# Patient Record
Sex: MALE | Race: WHITE | Employment: FULL TIME | URBAN - METROPOLITAN AREA
[De-identification: names, ages, dates, MRNs, and addresses within clinical notes are randomized per-mention and may not be internally consistent; named-entity substitution may affect disease eponyms.]

---

## 2017-08-30 ENCOUNTER — OFFICE VISIT (OUTPATIENT)
Dept: INTERNAL MEDICINE CLINIC | Age: 37
End: 2017-08-30

## 2017-08-30 VITALS
BODY MASS INDEX: 25.61 KG/M2 | HEART RATE: 88 BPM | SYSTOLIC BLOOD PRESSURE: 138 MMHG | DIASTOLIC BLOOD PRESSURE: 86 MMHG | HEIGHT: 68 IN | WEIGHT: 169 LBS

## 2017-08-30 DIAGNOSIS — K64.9 HEMORRHOIDS, UNSPECIFIED HEMORRHOID TYPE: ICD-10-CM

## 2017-08-30 DIAGNOSIS — K62.89 RECTAL PAIN: Primary | ICD-10-CM

## 2017-08-30 PROCEDURE — 99214 OFFICE O/P EST MOD 30 MIN: CPT | Performed by: FAMILY MEDICINE

## 2017-08-30 RX ORDER — IBUPROFEN 200 MG
200 TABLET ORAL EVERY 6 HOURS PRN
COMMUNITY

## 2017-08-30 RX ORDER — CALCIUM POLYCARBOPHIL 625 MG 625 MG/1
625 TABLET ORAL DAILY
COMMUNITY

## 2017-08-30 RX ORDER — DIPHENHYDRAMINE HCL 25 MG
25 CAPSULE ORAL EVERY 6 HOURS PRN
COMMUNITY

## 2017-08-30 RX ORDER — HYDROCORTISONE ACETATE 25 MG/1
25 SUPPOSITORY RECTAL 2 TIMES DAILY PRN
Qty: 20 SUPPOSITORY | Refills: 0 | Status: SHIPPED | OUTPATIENT
Start: 2017-08-30

## 2017-08-30 ASSESSMENT — ENCOUNTER SYMPTOMS
BLOOD IN STOOL: 0
CONSTIPATION: 0
VOMITING: 0
ANAL BLEEDING: 1
RECTAL PAIN: 1
ABDOMINAL PAIN: 0

## 2017-08-30 ASSESSMENT — PATIENT HEALTH QUESTIONNAIRE - PHQ9
SUM OF ALL RESPONSES TO PHQ9 QUESTIONS 1 & 2: 0
SUM OF ALL RESPONSES TO PHQ QUESTIONS 1-9: 0
1. LITTLE INTEREST OR PLEASURE IN DOING THINGS: 0
2. FEELING DOWN, DEPRESSED OR HOPELESS: 0

## 2017-09-11 ENCOUNTER — OFFICE VISIT (OUTPATIENT)
Dept: SURGERY | Age: 37
End: 2017-09-11

## 2017-09-11 VITALS
SYSTOLIC BLOOD PRESSURE: 134 MMHG | BODY MASS INDEX: 25.14 KG/M2 | DIASTOLIC BLOOD PRESSURE: 88 MMHG | HEIGHT: 68 IN | WEIGHT: 165.9 LBS

## 2017-09-11 DIAGNOSIS — K58.9 IRRITABLE BOWEL SYNDROME, UNSPECIFIED TYPE: Primary | ICD-10-CM

## 2017-09-11 PROCEDURE — 99243 OFF/OP CNSLTJ NEW/EST LOW 30: CPT | Performed by: SURGERY

## 2018-02-16 ENCOUNTER — HOSPITAL ENCOUNTER (OUTPATIENT)
Dept: OTHER | Age: 38
Discharge: HOME | End: 2018-02-16
Attending: SPECIALIST | Admitting: SPECIALIST

## 2018-02-16 LAB
ESTRADIOL LEVEL: 35 PG/ML
HCT VFR BLD CALC: 44.5 % (ref 40.5–52.5)

## 2018-02-20 LAB — TESTOSTERONE TOTAL: 946 NG/DL (ref 220–1000)

## 2022-05-29 ENCOUNTER — HOSPITAL ENCOUNTER (EMERGENCY)
Age: 42
Discharge: HOME | End: 2022-05-29
Payer: COMMERCIAL

## 2022-05-29 VITALS
HEART RATE: 69 BPM | DIASTOLIC BLOOD PRESSURE: 97 MMHG | SYSTOLIC BLOOD PRESSURE: 163 MMHG | OXYGEN SATURATION: 99 % | TEMPERATURE: 98.2 F | WEIGHT: 162 LBS | RESPIRATION RATE: 16 BRPM | BODY MASS INDEX: 24.55 KG/M2 | HEIGHT: 68 IN

## 2022-05-29 DIAGNOSIS — S61.012A LACERATION OF LEFT THUMB WITHOUT FOREIGN BODY WITHOUT DAMAGE TO NAIL, INITIAL ENCOUNTER: Primary | ICD-10-CM

## 2022-05-29 PROCEDURE — 12002 RPR S/N/AX/GEN/TRNK2.6-7.5CM: CPT

## 2022-05-29 PROCEDURE — 99283 EMERGENCY DEPT VISIT LOW MDM: CPT

## 2022-05-29 RX ORDER — CEPHALEXIN 500 MG/1
500 CAPSULE ORAL 4 TIMES DAILY
Qty: 28 CAPSULE | Refills: 0 | Status: SHIPPED | OUTPATIENT
Start: 2022-05-29 | End: 2022-06-05

## 2022-05-29 ASSESSMENT — ENCOUNTER SYMPTOMS
NAUSEA: 0
CHEST TIGHTNESS: 0
COUGH: 0
CONSTIPATION: 0
EYE REDNESS: 0
VOMITING: 0
ABDOMINAL PAIN: 0
DIARRHEA: 0
SORE THROAT: 0
RHINORRHEA: 0
SINUS PRESSURE: 0
SHORTNESS OF BREATH: 0
SINUS PAIN: 0
EYE DISCHARGE: 0

## 2022-05-29 ASSESSMENT — PAIN SCALES - GENERAL: PAINLEVEL_OUTOF10: 2

## 2022-05-29 ASSESSMENT — PAIN - FUNCTIONAL ASSESSMENT
PAIN_FUNCTIONAL_ASSESSMENT: NONE - DENIES PAIN
PAIN_FUNCTIONAL_ASSESSMENT: 0-10

## 2022-05-29 ASSESSMENT — PAIN DESCRIPTION - PAIN TYPE: TYPE: ACUTE PAIN

## 2022-05-29 ASSESSMENT — PAIN DESCRIPTION - LOCATION: LOCATION: HAND

## 2022-05-29 ASSESSMENT — PAIN DESCRIPTION - ORIENTATION: ORIENTATION: LEFT

## 2022-05-29 NOTE — ED NOTES
Laceration. Pt states \"I cut my left thumb with a knife cutting away trim on a deck\". Bleeding controlled. Pt right hand dominant.      Colonel Jay LPN  23/36/67 0896

## 2022-05-29 NOTE — ED NOTES
Pt suture repair applied with PSO/Adaptic/gauze/kerlex/coban to help keep dressing clean.      Antony Dominique LPN  54/80/04 1144

## 2022-05-29 NOTE — ED PROVIDER NOTES
[facility]  ED  EMERGENCY DEPARTMENT ENCOUNTER        Pt Name: Bertrand Lindo  MRN: [id number]  Armsalecgfmonique 1980  Date of evaluation: 5/29/2022  Provider: Magan Cobb PA-C  PCP: Kermit Sandifer, MD  ED Attending: No att. providers found      This patient was not seen and evaluated by the attending physician No att. providers found. I have independently evaluated this patient. CHIEF COMPLAINT       Chief Complaint   Patient presents with    Laceration     LEFT THUMB LACERATION WITH A KNIFE       HISTORY OF PRESENT ILLNESS   (Location/Symptom, Timing/Onset, Context/Setting, Quality, Duration, Modifying Factors, Severity)  Note limiting factors. Bertrand Lindo is a 42 y.o. male for valuation of a laceration to left hand first digit which occurred 20 minutes prior to arrival when patient was working on his deck at home and a knife slipped cutting his left hand. Patient is right-hand dominant. Patient indicates 2 out of 10 throbbing aching pain. Tetanus is UTD    Nursing Notes were all reviewed and agreed with or any disagreements were addressed  in the HPI. REVIEW OF SYSTEMS  (2-9 systems for level 4, 10 or more for level 5)     Review of Systems   Constitutional: Negative for chills and fever. HENT: Negative. Negative for congestion, rhinorrhea, sinus pressure, sinus pain and sore throat. Eyes: Negative for discharge, redness and visual disturbance. Respiratory: Negative for cough, chest tightness and shortness of breath. Cardiovascular: Negative for chest pain and palpitations. Gastrointestinal: Negative for abdominal pain, constipation, diarrhea, nausea and vomiting. Genitourinary: Negative for difficulty urinating, dysuria and frequency. Musculoskeletal: Negative. Skin: Positive for wound. Neurological: Negative. Negative for dizziness, weakness, numbness and headaches. Psychiatric/Behavioral: Negative.     All other systems reviewed and are negative. Positivesand Pertinent negatives as per HPI. Except as noted above in the ROS, all other systems were reviewed and negative. PAST MEDICAL HISTORY     Past Medical History:   Diagnosis Date    GERD (gastroesophageal reflux disease)          SURGICAL HISTORY       Past Surgical History:   Procedure Laterality Date    HYPOSPADIAS CORRECTION  1980    KNEE CARTILAGE SURGERY  1990    epi plyceile/ growth plate procedure         CURRENT MEDICATIONS       Discharge Medication List as of 5/29/2022  1:21 PM      CONTINUE these medications which have NOT CHANGED    Details   diphenhydrAMINE (BENADRYL) 25 MG capsule Take 25 mg by mouth every 6 hours as needed for ItchingHistorical Med      ibuprofen (ADVIL;MOTRIN) 200 MG tablet Take 200 mg by mouth every 6 hours as needed for PainHistorical Med      polycarbophil (FIBERCON) 625 MG tablet Take 625 mg by mouth dailyHistorical Med      hydrocortisone (ANUSOL-HC) 25 MG suppository Place 1 suppository rectally 2 times daily as needed for Hemorrhoids, Disp-20 suppository, R-0Normal      multivitamin (THERAGRAN) per tablet Take 1 tablet by mouth daily. ALLERGIES     Codeine    FAMILY HISTORY     History reviewed. No pertinent family history. SOCIAL HISTORY       Social History     Socioeconomic History    Marital status:      Spouse name: None    Number of children: None    Years of education: None    Highest education level: None   Occupational History    None   Tobacco Use    Smoking status: Current Every Day Smoker     Packs/day: 0.25     Years: 8.00     Pack years: 2.00    Smokeless tobacco: Never Used   Substance and Sexual Activity    Alcohol use:  Yes     Alcohol/week: 8.3 standard drinks     Types: 10 Standard drinks or equivalent per week    Drug use: No    Sexual activity: None   Other Topics Concern    None   Social History Narrative    None     Social Determinants of Health     Financial Resource Strain:     Difficulty of Paying Living Expenses: Not on file   Food Insecurity:     Worried About Running Out of Food in the Last Year: Not on file    Ran Out of Food in the Last Year: Not on file   Transportation Needs:     Lack of Transportation (Medical): Not on file    Lack of Transportation (Non-Medical): Not on file   Physical Activity:     Days of Exercise per Week: Not on file    Minutes of Exercise per Session: Not on file   Stress:     Feeling of Stress : Not on file   Social Connections:     Frequency of Communication with Friends and Family: Not on file    Frequency of Social Gatherings with Friends and Family: Not on file    Attends Alevism Services: Not on file    Active Member of [address] [place] [address] internetstores or Organizations: Not on file    Attends Club or Organization Meetings: Not on file    Marital Status: Not on file   Intimate Partner Violence:     Fear of Current or Ex-Partner: Not on file    Emotionally Abused: Not on file    Physically Abused: Not on file    Sexually Abused: Not on file   Housing Stability:     Unable to Pay for Housing in the Last Year: Not on file    Number of [place] in the Last Year: Not on file    Unstable Housing in the Last Year: Not on file       SCREENINGS     NIH Score       [facility]     Heart Score         PHYSICAL EXAM    (up to 7 for level 4, 8 ormore for level 5)     ED Triage Vitals [05/29/22 1204]   BP Temp Temp src Heart Rate Resp SpO2 Height Weight   (!) 165/98 (!) 96.3 °F (35.7 °C) -- 72 -- 97 % 5' 8\" (1.727 m) 162 lb (73.5 kg)       GENERAL APPEARANCE: Awake and alert. Cooperative. No acute distress. HEAD: Normocephalic. Atraumatic. EYES: PERRL. EOM's grossly intact. ENT: Mucous membranes are moist.   NECK: Supple. Normal ROM. CHEST: Equal symmetric chest rise. RRR  LUNGS: Breathing is unlabored. Speaking comfortably in full sentences. LCAB  Abdomen: Nondistended  EXTREMITIES: MAEE. No acute deformities. SKIN: Warm and dry.   Left hand first digit, dorsal aspect 3cm laceration. ROM normal, No fb, NV intact, SILT. NEUROLOGICAL: Alert and oriented. Strength is 5/5 in all extremities and sensation is intact. PROCEDURES   PROCEDURE:  LACERATION REPAIR  Harjinder Tao or their surrogate had an opportunity to ask questions, and the risks, benefits, and alternatives were discussed. The wound was prepped and draped to maintain a sterile field. A local anesthetic was used to completely anesthetize the wound. It was copiously irrigated. It was explored to its depth in a bloodless field with no sign of tendon, nerve, or vascular injury. No foreign bodies were identified. It was closed with 8x running locked sutures. There were no complications during the procedure. CRITICAL CARE TIME   N/A    Is this patient to be included in the SEP-1 Core Measure due to severe sepsis or septic shock? No   Exclusion criteria - the patient is NOT to be included for SEP-1 Core Measure due to: Infection is not suspected     CONSULTS:  None      EMERGENCY DEPARTMENT COURSE and DIFFERENTIAL DIAGNOSIS/MDM:   Vitals:    Vitals:    05/29/22 1204 05/29/22 1327   BP: (!) 165/98 (!) 163/97   Pulse: 72 69   Resp:  16   Temp: (!) 96.3 °F (35.7 °C) 98.2 °F (36.8 °C)   TempSrc:  Oral   SpO2: 97% 99%   Weight: 162 lb (73.5 kg)    Height: 5' 8\" (1.727 m)        Patient was given the following medications:  Medications - No data to display      Afebrile, stable, patient presents to the ED for evaluation. Nontoxic patient in no acute distress SPO2 on room air of 99% patient is not hypoxic. Patient with mild hypertension initial presentation blood pressure 163/97. Patient's wound is repaired without complication. Indications for return to the ED are discussed. Patient is advised suture removal in 10 days time. All questions are answered. Indications for return to the ED are discussed.   Patient is advised if any new or worsening symptoms arise they should immediately return to the emergency room. Follow-up with primary care in 1-2 days. The patient tolerated their visit well. The patient and / or the family were informed of the results of any tests, a time was given to answer questions, a plan was proposed and they agreed Rachael Sotelo. I estimate there is LOW risk for CELLULITIS, COMPARTMENT SYNDROME, NECROTIZING FASCIITIS, TENDON OR NEUROVASCULAR INJURY, or FOREIGN BODY, thus I consider the discharge disposition reasonable. Also, there is no evidence or peritonitis, sepsis, or toxicity. Iftikhar Genao and I have discussed the diagnosis and risks, and we agree with discharging home to follow-up with their primary doctor. We also discussed returning to the Emergency Department immediately if new or worsening symptoms occur. We have discussed the symptoms which are most concerning (e.g., changing or worsening pain, fever, numbness, weakness, cool or painful digits) that necessitate immediate return. FINAL IMPRESSION      1. Laceration of left thumb without foreign body without damage to nail, initial encounter          DISPOSITION/PLAN   DISPOSITION Decision To Discharge 05/29/2022 01:17:27 PM      PATIENT REFERRED TO:  Holly Hernandez MD  [address] [facility]   [facility] [address] [facility] [address]  [phone]    For suture removal in 10-12 days      DISCHARGE MEDICATIONS:  Discharge Medication List as of 5/29/2022  1:21 PM      START taking these medications    Details   cephALEXin (KEFLEX) 500 MG capsule Take 1 capsule by mouth 4 times daily for 7 days, Disp-28 capsule, R-0Print             DISCONTINUED MEDICATIONS:  Discharge Medication List as of 5/29/2022  1:21 PM                 Pt was seen during the COVID 19 pandemic. Appropriate PPE worn by ME during patient encounters.  Pt seen during a time with constrained hospital bed capacity and other potential inpatient and outpatient resources were constrained due to the viral pandemic.    Please note that portions of this note were completed with a voice recognition program.  Efforts were made to edit the dictations but occasionally words are mis-transcribed.)    Jaspal Draper PA-C (electronically signed)        Jaspal Draper PA-C  05/29/22 1551

## 2022-05-29 NOTE — ED NOTES
Pt wound was not cleansed and or irrigated with Hibiclens/or Normal Saline Solution prior to suture placement via Nurse or ED Tech. Estella SOSA made aware.      Zoila Vyas LPN  06/09/04 5532

## 2024-07-29 ENCOUNTER — TELEPHONE (OUTPATIENT)
Dept: CARDIOLOGY CLINIC | Age: 44
End: 2024-07-29

## 2024-08-06 ENCOUNTER — TELEPHONE (OUTPATIENT)
Dept: CARDIOLOGY CLINIC | Age: 44
End: 2024-08-06

## 2024-08-06 ENCOUNTER — OFFICE VISIT (OUTPATIENT)
Dept: CARDIOLOGY CLINIC | Age: 44
End: 2024-08-06

## 2024-08-06 ENCOUNTER — PATIENT MESSAGE (OUTPATIENT)
Dept: CARDIOLOGY CLINIC | Age: 44
End: 2024-08-06

## 2024-08-06 ENCOUNTER — ANCILLARY PROCEDURE (OUTPATIENT)
Dept: CARDIOLOGY CLINIC | Age: 44
End: 2024-08-06
Payer: COMMERCIAL

## 2024-08-06 VITALS
DIASTOLIC BLOOD PRESSURE: 94 MMHG | BODY MASS INDEX: 23.46 KG/M2 | HEIGHT: 68 IN | OXYGEN SATURATION: 99 % | WEIGHT: 154.8 LBS | HEART RATE: 63 BPM | SYSTOLIC BLOOD PRESSURE: 132 MMHG

## 2024-08-06 DIAGNOSIS — I48.0 PAF (PAROXYSMAL ATRIAL FIBRILLATION) (HCC): ICD-10-CM

## 2024-08-06 DIAGNOSIS — I49.9 IRREGULAR HEART RATE: ICD-10-CM

## 2024-08-06 DIAGNOSIS — I48.91 ATRIAL FIBRILLATION, UNSPECIFIED TYPE (HCC): Primary | ICD-10-CM

## 2024-08-06 DIAGNOSIS — I48.0 PAROXYSMAL ATRIAL FIBRILLATION (HCC): ICD-10-CM

## 2024-08-06 PROCEDURE — 93270 REMOTE 30 DAY ECG REV/REPORT: CPT | Performed by: INTERNAL MEDICINE

## 2024-08-06 RX ORDER — OMEPRAZOLE 10 MG/1
CAPSULE, DELAYED RELEASE ORAL
COMMUNITY
Start: 2010-02-02

## 2024-08-06 RX ORDER — ASPIRIN 325 MG
325 TABLET ORAL DAILY
COMMUNITY

## 2024-08-06 NOTE — PROGRESS NOTES
[facility]   Electrophysiology Consult Note              Date: 8/6/24  Patient Name: Harjinder Tao  YOB: 1980    Primary Care Physician: Jose Guadalupe Thomson MD    CHIEF COMPLAINT:   Chief Complaint   Patient presents with    New Patient    Atrial Fibrillation    Palpitations     HISTORY OF PRESENT ILLNESS: Harjinder Tao is a 43 y.o. male who presents for evaluation for PAF. Patient was in ER in 7/2024 with new onset AF and had successful DCCV at [facility].     Today, 8/6/2024, EKG demonstrates SR 63 bpm. He reports that he is doing ok since discharge from hospital. He drinks alcohol daily, but is trying to cut back on this. He could tell when he was in AF prior to going to the ER. He is taking his medications as prescribed. Patient denies current edema, chest pain, shortness of breath, palpitations, dizziness or syncope.     Past Medical History:   has a past medical history of GERD (gastroesophageal reflux disease).    Past Surgical History:   has a past surgical history that includes Knee cartilage surgery (1990) and Hypospadius correction (1980).     Allergies:  Codeine    Social History:   reports that he has been smoking cigarettes. He has a 1.5 pack-year smoking history. He has never used smokeless tobacco. He reports current alcohol use of about 8.3 standard drinks of alcohol per week. He reports that he does not use drugs.     Family History: family history includes Cancer in his maternal grandfather; Heart Surgery in his maternal grandmother; High Blood Pressure in his maternal grandfather and maternal grandmother; High Cholesterol in his maternal grandmother and mother; Other in his father and mother.    Home Medications:    Prior to Admission medications    Medication Sig Start Date End Date Taking? Authorizing Provider   aspirin 325 MG tablet Take 1 tablet by mouth daily   Yes Provider, MD Solomon   omeprazole (PRILOSEC) 10 MG delayed release capsule  2/2/10  Yes Provider,

## 2024-08-06 NOTE — PATIENT INSTRUCTIONS
RECOMMENDATIONS:  Discussed at length treatment options for AF:   1. Medications to slow heart rate (goal of less than 90 at rest, less than 110 with mild activity)   2. Medications for rhythm control (amiodarone, flecainide, dronedarone, and propafenone or dofetilide or sotalol which requires hospital admission for four days). Discussed risks and benefits.   3. Ablation to burn the abnormal electrical activity within the heart. This is not a cure for AF but will help to suppress it. Discussed risks and benefits.   4. Pace and Ablate to put pacemaker in the heart and burn the electrical activity in the heart. Would be dependent upon pacemaker for the rest of your life. This would be a last resort.   5. Cardioversion to electrically shock heart back into normal rhythm.  2.  Consider oral anticoagulant to prevent stroke in the presence of AF. Eliquis, Xarelto, or coumadin.    3.  Consider [employer] device, Apple Watch with EKG capabilities, or implantable loop recorder for long term monitoring.   4.  Need echocardiogram.   5.  Start atenolol 25 mg nightly.   6.  Event monitor for 2 weeks.   7.  Follow up in 6-8 weeks.

## 2024-08-06 NOTE — TELEPHONE ENCOUNTER
Monitor placed by [facility]  Monitor company [employer]  Length of monitor 14 DAY  Monitor ordered by [facility]   Serial number [id number]  Patch ID [id number]  Activation successful prior to pt leaving office? Yes

## 2024-08-08 RX ORDER — ATENOLOL 25 MG/1
25 TABLET ORAL DAILY
Qty: 30 TABLET | Refills: 3 | Status: SHIPPED | OUTPATIENT
Start: 2024-08-08

## 2024-08-08 NOTE — PROGRESS NOTES
Harjinder Tao (1980) is a 43 y.o. male who presents for who presents for {:58140} of {:29009}. The patient has a past history of {:52972}. {Symptoms (Optional):28281}    {Common ambulatory SmartLinks:27470}    Current Outpatient Medications   Medication Sig Dispense Refill   • atenolol (TENORMIN) 25 MG tablet Take 1 tablet by mouth daily 30 tablet 3   • aspirin 325 MG tablet Take 1 tablet by mouth daily     • omeprazole (PRILOSEC) 10 MG delayed release capsule      • polycarbophil (FIBERCON) 625 MG tablet Take 1 tablet by mouth daily     • multivitamin (THERAGRAN) per tablet Take 1 tablet by mouth daily     • diphenhydrAMINE (BENADRYL) 25 MG capsule Take 25 mg by mouth every 6 hours as needed for Itching (Patient not taking: Reported on 5/29/2022)     • hydrocortisone (ANUSOL-HC) 25 MG suppository Place 1 suppository rectally 2 times daily as needed for Hemorrhoids (Patient not taking: Reported on 5/29/2022) 20 suppository 0     No current facility-administered medications for this visit.       Review of Systems     Objective:  BP (!) 132/94   Pulse 63   Ht 1.727 m (5' 7.99\")   Wt 70.2 kg (154 lb 12.8 oz)   SpO2 99%   BMI 23.54 kg/m²   Physical Exam    Data:  ECG: {ekg findings:316034}  ECHO: ***  {Chest X-ray (Optional):55725}     Lab Review   Lab Results   Component Value Date/Time    HCT 44.5 02/16/2018 08:05 AM      No results found for: \"NA\", \"K\", \"CL\", \"CO2\", \"BUN\", \"CREATININE\", \"GLUCOSE\", \"CALCIUM\", \"LABALBU\", \"BILITOT\", \"AST\", \"ALT\"   No results found for: \"NA\", \"K\", \"CL\", \"CO2\", \"BUN\", \"CREATININE\", \"GLUCOSE\", \"CALCIUM\"   No results found for: \"ALKPHOS\", \"ALT\", \"AST\", \"BILITOT\", \"BILIDIR\", \"LABALBU\"   No results found for: \"TSH\", \"TSHFT4\", \"TSHELE\", \"NTD3JXI\", \"TSHHS\"     DYK4TD7-LEBe Score: 0  Disclaimer: Risk Score calculation is dependent on accuracy of patient problem list and past encounter diagnosis.          HAS-BLED Score                            Risk Factors      Points

## 2024-08-27 LAB — ECHO BSA: 1.84 M2

## 2024-08-27 PROCEDURE — 93272 ECG/REVIEW INTERPRET ONLY: CPT | Performed by: INTERNAL MEDICINE

## 2024-09-09 ENCOUNTER — TELEPHONE (OUTPATIENT)
Dept: CARDIOLOGY CLINIC | Age: 44
End: 2024-09-09

## 2024-10-01 ENCOUNTER — OFFICE VISIT (OUTPATIENT)
Dept: CARDIOLOGY CLINIC | Age: 44
End: 2024-10-01
Payer: COMMERCIAL

## 2024-10-01 VITALS
BODY MASS INDEX: 24.58 KG/M2 | WEIGHT: 156.6 LBS | DIASTOLIC BLOOD PRESSURE: 80 MMHG | HEART RATE: 60 BPM | OXYGEN SATURATION: 98 % | HEIGHT: 67 IN | SYSTOLIC BLOOD PRESSURE: 126 MMHG

## 2024-10-01 DIAGNOSIS — R06.02 SHORTNESS OF BREATH: Primary | ICD-10-CM

## 2024-10-01 DIAGNOSIS — I48.0 PAF (PAROXYSMAL ATRIAL FIBRILLATION) (HCC): ICD-10-CM

## 2024-10-01 PROCEDURE — 99214 OFFICE O/P EST MOD 30 MIN: CPT | Performed by: INTERNAL MEDICINE

## 2024-10-01 PROCEDURE — 93000 ELECTROCARDIOGRAM COMPLETE: CPT | Performed by: INTERNAL MEDICINE

## 2024-10-01 RX ORDER — SODIUM CHLORIDE 9 MG/ML
INJECTION, SOLUTION INTRAVENOUS PRN
OUTPATIENT
Start: 2024-10-01

## 2024-10-01 RX ORDER — NITROGLYCERIN 0.4 MG/1
0.8 TABLET SUBLINGUAL
OUTPATIENT
Start: 2024-10-01 | End: 2024-10-02

## 2024-10-01 RX ORDER — FLUTICASONE PROPIONATE 50 MCG
1 SPRAY, SUSPENSION (ML) NASAL DAILY
COMMUNITY

## 2024-10-01 RX ORDER — NITROGLYCERIN 0.4 MG/1
0.4 TABLET SUBLINGUAL
OUTPATIENT
Start: 2024-10-01 | End: 2024-10-02

## 2024-10-01 RX ORDER — SODIUM CHLORIDE 0.9 % (FLUSH) 0.9 %
5-40 SYRINGE (ML) INJECTION EVERY 12 HOURS SCHEDULED
OUTPATIENT
Start: 2024-10-01

## 2024-10-01 RX ORDER — METOPROLOL TARTRATE 1 MG/ML
5 INJECTION, SOLUTION INTRAVENOUS EVERY 5 MIN PRN
OUTPATIENT
Start: 2024-10-01

## 2024-10-01 RX ORDER — SODIUM CHLORIDE 0.9 % (FLUSH) 0.9 %
5-40 SYRINGE (ML) INJECTION PRN
OUTPATIENT
Start: 2024-10-01

## 2024-10-01 RX ORDER — CETIRIZINE HYDROCHLORIDE 10 MG/1
10 TABLET ORAL DAILY
COMMUNITY

## 2024-10-01 NOTE — PATIENT INSTRUCTIONS
RECOMMENDATIONS:  Discussed mildly abnormal echocardiogram.  Cardiac CTA. Call to schedule this.   Cardiac MRI. Call to schedule this.   Continue atenolol.   Follow up in 3 months.     Your provider has ordered testing for further evaluation.  An order/prescription has been included in your paper work.   To schedule outpatient testing, contact Central Scheduling by calling [facility] ([phone]).

## 2024-10-01 NOTE — PROGRESS NOTES
Left Ventricle: Mildly reduced left ventricular systolic function with a visually estimated EF of 45 - 50%. Left ventricle size is normal. Normal wall thickness. Anteroseptal wall appears hypokinetic. Grade I diastolic dysfunction with normal LAP.    Right Ventricle: Right ventricle size is normal. Normal systolic function.    IMPRESSION:    New onset atrial fibrillation (BOHFM0OTHb score 0-1 for hypertension.).  8/6/2024  Patient is a pleasant 43-year-old male with a medical history significant for newly diagnosed atrial fibrillation status post cardioversion and borderline hypertension who presents from home to [facility].  Patient recently was diagnosed with symptomatic atrial fibrillation with RVR.  He was cardioverted in the emergency room and discharged home.  He now prevents for further evaluation.  We discussed anticoagulation (NOAC and warfarin), rate control, rhythm control, AVN + pacemaker, and atrial fibrillation ablation.  We reviewed risks and benefits of each approach.  We discussed side effects of class IC, class II, class III, and class IV antiarrhythmics.  All questions were answered.  Will ask for an echocardiogram.  Will ask for a 2-week heart monitor.  He will consider oral anticoagulation given his CHADS2 Vascor of 0-1.  Will start him on atenolol.  - Consider OAC.  - Star atenolol 25 mg daily.  - Two week cardiac monitor.  - Echocardiogram.  - Follow up with us in 6-8 weeks.    10/1/2024  ***    RECOMMENDATIONS:  ***    All questions and concerns were addressed to the patient/family. Alternatives to my treatment were discussed.    Dr. MORGAN Ellis MD  Electrophysiology  [facility].  [address] [place] [address]. [address].  [place] [address]  Phone: [phone]  Fax: [phone]     NOTE: This report was transcribed using voice recognition software. Every effort was made to ensure accuracy, however, inadvertent computerized transcription errors may be present.     ***    ***  
both accurate and complete.    Electronically signed by REFUGIO Ellis Jr, MD on 10/2/2024 at 8:58 AM

## 2024-11-25 ENCOUNTER — TELEPHONE (OUTPATIENT)
Dept: CARDIOLOGY CLINIC | Age: 44
End: 2024-11-25

## 2024-11-25 DIAGNOSIS — I48.91 ATRIAL FIBRILLATION, UNSPECIFIED TYPE (HCC): ICD-10-CM

## 2024-11-25 RX ORDER — ATENOLOL 25 MG/1
25 TABLET ORAL DAILY
Qty: 90 TABLET | Refills: 3 | Status: SHIPPED | OUTPATIENT
Start: 2024-11-25

## 2024-11-25 NOTE — TELEPHONE ENCOUNTER
11/25- called pt @[phone] relayed RNEW msg. Pt v/u and stated he will call off from work the day before due to his job requiring a lot of walking, lifting, and climbing ladders.

## 2024-11-25 NOTE — TELEPHONE ENCOUNTER
Pt stated that the instructions for the test state to not do anything strenuous the day before the test. Pt wants to know if he should take off the day before or not do as much lift and running around the day before the test. Please advise    Call back: [phone]

## 2024-12-04 ENCOUNTER — TELEPHONE (OUTPATIENT)
Dept: CARDIOLOGY CLINIC | Age: 44
End: 2024-12-04

## 2024-12-04 ENCOUNTER — HOSPITAL ENCOUNTER (OUTPATIENT)
Dept: CT IMAGING | Age: 44
Discharge: HOME | End: 2024-12-04
Attending: INTERNAL MEDICINE
Payer: COMMERCIAL

## 2024-12-04 ENCOUNTER — HOSPITAL ENCOUNTER (OUTPATIENT)
Dept: MRI IMAGING | Age: 44
Discharge: HOME | End: 2024-12-04
Attending: INTERNAL MEDICINE
Payer: COMMERCIAL

## 2024-12-04 VITALS — DIASTOLIC BLOOD PRESSURE: 62 MMHG | SYSTOLIC BLOOD PRESSURE: 108 MMHG | HEART RATE: 65 BPM

## 2024-12-04 DIAGNOSIS — R06.02 SHORTNESS OF BREATH: ICD-10-CM

## 2024-12-04 PROCEDURE — 6370000000 HC RX 637 (ALT 250 FOR IP): Performed by: RADIOLOGY

## 2024-12-04 PROCEDURE — A9585 GADOBUTROL INJECTION: HCPCS | Performed by: INTERNAL MEDICINE

## 2024-12-04 PROCEDURE — 75574 CT ANGIO HRT W/3D IMAGE: CPT

## 2024-12-04 PROCEDURE — 6360000004 HC RX CONTRAST MEDICATION: Performed by: INTERNAL MEDICINE

## 2024-12-04 PROCEDURE — 75565 CARD MRI VELOC FLOW MAPPING: CPT

## 2024-12-04 RX ORDER — SODIUM CHLORIDE 9 MG/ML
INJECTION, SOLUTION INTRAVENOUS PRN
Status: DISCONTINUED | OUTPATIENT
Start: 2024-12-04 | End: 2024-12-05 | Stop reason: HOSPADM

## 2024-12-04 RX ORDER — IOPAMIDOL 755 MG/ML
100 INJECTION, SOLUTION INTRAVASCULAR
Status: COMPLETED | OUTPATIENT
Start: 2024-12-04 | End: 2024-12-04

## 2024-12-04 RX ORDER — NITROGLYCERIN 0.4 MG/1
0.4 TABLET SUBLINGUAL
Status: COMPLETED | OUTPATIENT
Start: 2024-12-04 | End: 2024-12-04

## 2024-12-04 RX ORDER — METOPROLOL TARTRATE 1 MG/ML
5 INJECTION, SOLUTION INTRAVENOUS EVERY 5 MIN PRN
Status: DISCONTINUED | OUTPATIENT
Start: 2024-12-04 | End: 2024-12-05 | Stop reason: HOSPADM

## 2024-12-04 RX ORDER — SODIUM CHLORIDE 0.9 % (FLUSH) 0.9 %
5-40 SYRINGE (ML) INJECTION PRN
Status: DISCONTINUED | OUTPATIENT
Start: 2024-12-04 | End: 2024-12-05 | Stop reason: HOSPADM

## 2024-12-04 RX ORDER — NITROGLYCERIN 0.4 MG/1
0.8 TABLET SUBLINGUAL
Status: COMPLETED | OUTPATIENT
Start: 2024-12-04 | End: 2024-12-04

## 2024-12-04 RX ORDER — GADOBUTROL 604.72 MG/ML
11 INJECTION INTRAVENOUS
Status: COMPLETED | OUTPATIENT
Start: 2024-12-04 | End: 2024-12-04

## 2024-12-04 RX ORDER — SODIUM CHLORIDE 0.9 % (FLUSH) 0.9 %
5-40 SYRINGE (ML) INJECTION EVERY 12 HOURS SCHEDULED
Status: DISCONTINUED | OUTPATIENT
Start: 2024-12-04 | End: 2024-12-05 | Stop reason: HOSPADM

## 2024-12-04 RX ADMIN — IOPAMIDOL 100 ML: 755 INJECTION, SOLUTION INTRAVENOUS at 13:17

## 2024-12-04 RX ADMIN — GADOBUTROL 11 ML: 604.72 INJECTION INTRAVENOUS at 15:29

## 2024-12-04 RX ADMIN — NITROGLYCERIN 0.8 MG: 0.4 TABLET SUBLINGUAL at 13:17

## 2024-12-04 NOTE — TELEPHONE ENCOUNTER
RN attempted to contact patient to relay AGK message. Unable to LVM due to mailbox being full. Staff will re-attempt at another time.     If patient returns call, please relay AGK message and set up appointment with cardiology per AGK request. Thank you.

## 2024-12-04 NOTE — TELEPHONE ENCOUNTER
----- Message from Dr. REFUGIO Ellis MD sent at 12/4/2024  4:46 PM EST -----  Reviewed.  Please let patient know that CT did find some mild to moderate narrowing in the arteries that supply his heart.  I'd like to have him establish with preventative cardiology team.  Thanks.

## 2024-12-05 ENCOUNTER — HOSPITAL ENCOUNTER (OUTPATIENT)
Dept: CT IMAGING | Age: 44
Discharge: HOME | End: 2024-12-05
Payer: COMMERCIAL

## 2024-12-05 DIAGNOSIS — R93.89 ABNORMAL COMPUTED TOMOGRAPHY ANGIOGRAPHY (CTA): ICD-10-CM

## 2024-12-05 DIAGNOSIS — Z03.89 UNDER OBSERVATION FOR SUSPECTED CORONARY ARTERY DISEASE: ICD-10-CM

## 2024-12-05 PROCEDURE — 75580 N-INVAS EST C FFR SW ALY CTA: CPT

## 2024-12-05 NOTE — TELEPHONE ENCOUNTER
Relayed message- updated result mgmt. Pt schedule with VSP on 1/15/25. Pt asking if AGK still wants him to come in on 1/7/25 to see him and if he needs to see IC any sooner. Please advise.

## 2024-12-07 ENCOUNTER — OFFICE VISIT (OUTPATIENT)
Age: 44
End: 2024-12-07

## 2024-12-07 VITALS
OXYGEN SATURATION: 98 % | SYSTOLIC BLOOD PRESSURE: 120 MMHG | HEIGHT: 67 IN | HEART RATE: 79 BPM | TEMPERATURE: 97.3 F | WEIGHT: 156 LBS | DIASTOLIC BLOOD PRESSURE: 70 MMHG | BODY MASS INDEX: 24.48 KG/M2

## 2024-12-07 DIAGNOSIS — K64.9 ACUTE HEMORRHOID: Primary | ICD-10-CM

## 2024-12-07 RX ORDER — HYDROCORTISONE ACETATE 25 MG/1
25 SUPPOSITORY RECTAL 2 TIMES DAILY PRN
Qty: 30 SUPPOSITORY | Refills: 0 | Status: SHIPPED | OUTPATIENT
Start: 2024-12-07

## 2024-12-07 NOTE — PROGRESS NOTES
Harjinder Tao (:  1980) is a 44 y.o. male,New patient, here for evaluation of the following chief complaint(s):  Hemorrhoids      ASSESSMENT/PLAN:    ICD-10-CM    1. Acute hemorrhoid  K64.9 hydrocortisone (ANUSOL-HC) 25 MG suppository     [facility] Colon [facility] Rectal Surgery          Referral for anorectal. This will likely have to be cauterized as discussed with patient.  ER if any worsening such as significant blood loss which makes you feel weak or tired.     SUBJECTIVE/OBJECTIVE:    History provided by:  Patient   used: No      HPI:   44 y.o. male presents with symptoms of hemorrhoid ongoing since the last few days. Denies weakness, fatigue, change in diet, heavy lifting, or increased straining. Has taken prep h for symptoms without relief. Has had some brighter blood on stool. Typically takes fiber supplements and eats well.   Patient states he has dealt with hemorrhoids off and on for years and most of the time he can get them to improve. States he has a small external one that is chronic but typically does not bother him. States this one is new and more to the left, he is able to push it back in but it comes right back out to make it external.     Vitals:    24 1525   BP: 120/70   Pulse: 79   Temp: 97.3 °F (36.3 °C)   TempSrc: Temporal   SpO2: 98%   Weight: 70.8 kg (156 lb)   Height: 1.702 m (5' 7\")       Review of Systems   Constitutional:  Negative for activity change, appetite change, fatigue and fever.   Gastrointestinal:  Positive for blood in stool and rectal pain. Negative for abdominal pain, constipation, diarrhea, nausea and vomiting.   Endocrine: Negative for polyuria.   Genitourinary:  Negative for difficulty urinating and dysuria.   Musculoskeletal:  Negative for myalgias.   Skin:  Negative for rash and wound.       Physical Exam  Constitutional:       Appearance: Normal appearance.   Eyes:      Pupils: Pupils are equal, round, and reactive to light.

## 2024-12-07 NOTE — PATIENT INSTRUCTIONS
Referral for ColoRectal given.    Sit in a few inches of warm water (sitz bath) 3 times a day and after bowel movements. The warm water helps with pain and itching.  Put ice on your anal area several times a day for 10 minutes at a time. Put a thin cloth between the ice and your skin. Follow this by placing a warm, wet towel on the area for another 10 to 20 minutes.  Take pain medicines exactly as directed.  If the doctor gave you a prescription medicine for pain, take it as prescribed.  If you are not taking a prescription pain medicine, ask your doctor if you can take an over-the-counter medicine.  Keep the anal area clean, but be gentle. Use water and a fragrance-free soap, or use baby wipes or medicated pads such as Tucks.  Wear cotton underwear and loose clothing to decrease moisture in the anal area.  Eat more fiber. Include foods such as whole-grain breads and cereals, raw vegetables, raw and dried fruits, and beans.  Drink plenty of fluids. If you have kidney, heart, or liver disease and have to limit fluids, talk with your doctor before you increase the amount of fluids you drink.  Use a stool softener that contains bran or psyllium. You can save money by buying bran or psyllium (available in bulk at most health food stores) and sprinkling it on foods or stirring it into fruit juice. Or you can use a product such as Metamucil or Hydrocil.  Practice healthy bowel habits.  Go to the bathroom as soon as you have the urge.  Avoid straining to pass stools. Relax and give yourself time to let things happen naturally.

## 2025-01-07 ENCOUNTER — OFFICE VISIT (OUTPATIENT)
Dept: CARDIOLOGY CLINIC | Age: 45
End: 2025-01-07

## 2025-01-07 VITALS
HEART RATE: 66 BPM | HEIGHT: 67 IN | SYSTOLIC BLOOD PRESSURE: 122 MMHG | BODY MASS INDEX: 23.86 KG/M2 | WEIGHT: 152 LBS | DIASTOLIC BLOOD PRESSURE: 82 MMHG | OXYGEN SATURATION: 100 %

## 2025-01-07 DIAGNOSIS — I48.0 PAF (PAROXYSMAL ATRIAL FIBRILLATION) (HCC): Primary | ICD-10-CM

## 2025-01-07 RX ORDER — IBUPROFEN 600 MG/1
600 TABLET, FILM COATED ORAL EVERY 6 HOURS PRN
COMMUNITY
Start: 2024-12-23

## 2025-01-07 RX ORDER — DIAZEPAM 5 MG/1
5 TABLET ORAL EVERY 6 HOURS PRN
COMMUNITY
Start: 2024-12-10

## 2025-01-07 RX ORDER — ACETAMINOPHEN 325 MG/1
650 TABLET ORAL EVERY 6 HOURS PRN
COMMUNITY
Start: 2024-12-23

## 2025-01-07 NOTE — PROGRESS NOTES
since start of alcohol in am hopeful that his alcohol use is the etiology of his cardiomyopathy.  I will ask for a CT coronary to rule out ischemic pathology.  Labs for a cardiac MRI given his age and depressed left ventricular ejection fraction.  We will follow-up with patient in 3 months.  No other changes recommended at this point.  Okay to continue work.  - Cardiac CTA or ischemic pathology.  - Cardiac MRI.  - Continue atenolol 25 mg daily.  - Follow up with me in 3 months.    01/07/2025  Patient presents from home for follow-up.  He recently had a severe rectal hemorrhoid that required surgical intervention.  He is currently in the healing phase postoperatively.  No known clinical recurrence of his atrial fibrillation.  CT of his coronaries showed moderate disease and he has been referred to interventional cardiology when see my partner, Dr. Carrasco.  Patient is not a candidate for 1C agents at this point given his coronary artery disease.    Patient is high risk for stroke or systemic thromboembolism. Patient is a poor candidate for long term anticoagulation.     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    A decision aid tool (Decision AID for AFIB Stroke Prevention, CardioSmart, ACC) was used for discussing risks, benefits and alternatives to anticoagulation and left atrial appendage closure devices.      Based on both stroke and bleeding risk, a shared decision has been made to pursue closure of the left atrial appendage as an alternative to oral

## 2025-01-07 NOTE — PATIENT INSTRUCTIONS
RECOMMENDATIONS:  Discussed risks and benefits of Watchman procedure given increased risk of stroke given new diagnosis of CAD and inability to take OAC due to occupation.    -We will ask Dr. Carrasco to do shared decision at your upcoming appointment.    -Let us know how you would like to proceed.   2. Continue cardiac medications as prescribed.  3. Follow up with Dr. Carrasco as scheduled.   4. Follow up with me in 6 months.

## 2025-01-14 NOTE — PROGRESS NOTES
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 He does smoke E-cigarettes. He has cut back alcohol consumption. He did note he had family over and drank more alcohol than usual prior to ED visit. He walks over 20,000 steps a day denies limitations. Denies any reoccurrence of symptoms. He recently went through hemorrhoidectomy. GI wise he is still recovering. 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       Patient Active Problem List   Diagnosis    PAF (paroxysmal atrial fibrillation) (HCC)    Coronary artery disease involving native coronary artery of native heart    Electronic cigarette use    Former tobacco use         Cardiac Testing: I have reviewed the findings below.  EKG:  ECHO:   STRESS TEST:  CATH:  BYPASS:  VASCULAR:    Past Medical History:   has a past medical history of GERD (gastroesophageal reflux disease).    Surgical History:   has a past surgical history that includes Knee cartilage surgery (1990) and Hypospadius correction (1980).     Social History:   reports that he has quit smoking. His smoking use included cigarettes. He has a 1.5 pack-year smoking history. He has never used smokeless tobacco. He reports current alcohol use of about 8.3 standard drinks of alcohol per week. He reports that he does not use drugs.     Family History:  No evidence for sudden cardiac death or premature CAD    Medications:  Reviewed and are listed in nursing record. and/or listed below  Outpatient Medications:  Prior to Admission medications    Medication Sig Start Date End Date Taking? Authorizing Provider   polyethylene glycol (GLYCOLAX) 17 GM/SCOOP powder Take 17 g by mouth daily   Yes Solomon Franklin MD   acetaminophen (TYLENOL) 325 MG tablet Take 2 tablets by mouth every 6 hours as needed 12/23/24  Yes Solomon Franklin MD   ibuprofen (ADVIL;MOTRIN) 600 MG tablet Take 1 tablet by mouth every 6 hours as needed 12/23/24  Yes Solomon Franklin MD   atenolol

## 2025-01-15 ENCOUNTER — OFFICE VISIT (OUTPATIENT)
Age: 45
End: 2025-01-15

## 2025-01-15 VITALS
BODY MASS INDEX: 23.73 KG/M2 | HEIGHT: 67 IN | HEART RATE: 71 BPM | DIASTOLIC BLOOD PRESSURE: 76 MMHG | OXYGEN SATURATION: 97 % | SYSTOLIC BLOOD PRESSURE: 128 MMHG | WEIGHT: 151.2 LBS

## 2025-01-15 DIAGNOSIS — I48.0 PAF (PAROXYSMAL ATRIAL FIBRILLATION) (HCC): Primary | ICD-10-CM

## 2025-01-15 DIAGNOSIS — Z78.9 ELECTRONIC CIGARETTE USE: ICD-10-CM

## 2025-01-15 DIAGNOSIS — I25.10 CORONARY ARTERY DISEASE INVOLVING NATIVE CORONARY ARTERY OF NATIVE HEART, UNSPECIFIED WHETHER ANGINA PRESENT: ICD-10-CM

## 2025-01-15 DIAGNOSIS — Z79.899 MEDICATION MANAGEMENT: ICD-10-CM

## 2025-01-15 DIAGNOSIS — Z87.891 FORMER TOBACCO USE: ICD-10-CM

## 2025-01-15 RX ORDER — POLYETHYLENE GLYCOL 3350 17 G/17G
17 POWDER, FOR SOLUTION ORAL DAILY
COMMUNITY

## 2025-01-15 NOTE — PATIENT INSTRUCTIONS
Plan:  Electronic Cigarette Cessation encouraged   Spent 3-10 minutes counseling patient on smoking cessation, discussing strategies and resources to support the patient in quitting.  Advised patient to quit and offered support.  Educational material provided to patient.  Order fasting lipids ~ assess fasting cholesterol   ~    Will discuss initiating statin therapy based upon results   Reviewed cardiac CTA with heart flow analysis ~ coronary artery disease, plan for goal directed medication therapy  Continue to follow with Electrophysiology as planned for paroxysmal atrial fibrillation and Watchman consultation  Follow up with me based on lab work results we will call you to discuss

## 2025-01-27 DIAGNOSIS — Z79.899 MEDICATION MANAGEMENT: ICD-10-CM

## 2025-01-27 LAB
CHOLEST SERPL-MCNC: 178 MG/DL (ref 0–199)
HDLC SERPL-MCNC: 96 MG/DL (ref 40–60)
LDLC SERPL CALC-MCNC: 69 MG/DL
TRIGL SERPL-MCNC: 66 MG/DL (ref 0–150)
VLDLC SERPL CALC-MCNC: 13 MG/DL

## 2025-01-28 NOTE — RESULT ENCOUNTER NOTE
Spoke with patient. Relayed VSP results. Patient V/U. Follow up appt made with VSP he VU to time,date,and location.

## 2025-02-19 NOTE — TELEPHONE ENCOUNTER
From: Harjinder Tao  To: Dr. REFUGIO Ellis  Sent: 8/6/2024 5:46 PM EDT  Subject: EKG Images    Attached are the two EKG's that were on my [facility] My Chart to see if you see a phib or flutter. I'll still be contacting the office to see if they can send over the records of the full EKG for you.   Render Risk Assessment In Note?: no Detail Level: Detailed Additional Notes: Pt declines Rx today. Additional Notes: Disc PDT Red for the face, pt declines order today. May revisit in the future.

## 2025-07-02 ENCOUNTER — OFFICE VISIT (OUTPATIENT)
Dept: CARDIOLOGY CLINIC | Age: 45
End: 2025-07-02
Payer: COMMERCIAL

## 2025-07-02 VITALS
WEIGHT: 150.6 LBS | OXYGEN SATURATION: 100 % | SYSTOLIC BLOOD PRESSURE: 120 MMHG | DIASTOLIC BLOOD PRESSURE: 80 MMHG | HEIGHT: 67 IN | HEART RATE: 60 BPM | BODY MASS INDEX: 23.64 KG/M2

## 2025-07-02 DIAGNOSIS — I48.0 PAF (PAROXYSMAL ATRIAL FIBRILLATION) (HCC): Primary | ICD-10-CM

## 2025-07-02 DIAGNOSIS — I25.10 CORONARY ARTERY DISEASE INVOLVING NATIVE CORONARY ARTERY OF NATIVE HEART, UNSPECIFIED WHETHER ANGINA PRESENT: ICD-10-CM

## 2025-07-02 DIAGNOSIS — Z78.9 ELECTRONIC CIGARETTE USE: ICD-10-CM

## 2025-07-02 PROCEDURE — 93000 ELECTROCARDIOGRAM COMPLETE: CPT

## 2025-07-02 PROCEDURE — 99214 OFFICE O/P EST MOD 30 MIN: CPT

## 2025-07-02 NOTE — PROGRESS NOTES
[facility]   Electrophysiology Outpatient Note              Date:  July 2, 2025  Patient name: Harjinder Tao  YOB: 1980    Primary Care physician: Jose Guadalupe Thomson MD    HISTORY OF PRESENT ILLNESS: The patient is a 44 y.o.  male with a history of paroxysmal atrial fibrillation, daily alcohol use    Patient follows with Dr Ellis in EP clinic. 7/2024 patient presented to ER with new onset AF and underwent successful DCCV. Patient wore a cardiac event monitor from 8/6/2024 to 8/21/2024 which demonstrated predominately SR with an average HR of 76 () BPM. PAC burden 0.03%, PVC burden 0.09%. Echo 8/21/2024 demonstrated an LVEF of 45-50%. Therefore he proceeded with an cardiac MRI 12/2024 demonstrated an LVEF of 61% with mild right atrial enlargement and mild TR. He had an Cardiac CTA 12/2024 demonstrated a calcium score of 536 (LM 0, , , Cx 38).  Heartflow performed moderate stenosis in the proximal to mid LAD with calcific and soft plaque component estimated at 50 to 70%.     1/15/2025 Patient was seen by Dr Carrasco for shared decision of Watchman Device implant. Patient was recommended to continue with Watchman workup    Today he is being seen for paroxysmal atrial fibrillation. ECG shows SR with a HR of 60. Patient denies chest pain, shortness breath and palpitations.  Patient states he is doing well overall.  He takes atenolol as directed.  Patient was seen by Dr. Ellis for atrial fibrillation and underwent cardioversion.  Patient had issues with bleeding hemorrhoid 12/2024 and underwent corrective procedure.  At his last visit with Dr. Ellis Watchman device implant was discussed.  Patient then followed up with Dr. Caledron for shared decision making.  Patient would like to proceed with Watchman device.  We discussed the procedure at length and including pre-MARILUZ or CT scan to verify left atrial appendage size.  He is also aware of procedure and 45-59 day MARILUZ

## 2025-07-10 RX ORDER — ASPIRIN 81 MG/1
81 TABLET ORAL DAILY
Qty: 90 TABLET | Refills: 0 | Status: SHIPPED | OUTPATIENT
Start: 2025-07-10

## 2025-07-10 NOTE — TELEPHONE ENCOUNTER
I attempted to call the patient to relay NPKK message.  is full and unable to leave . Staff will reattempt to call at a later time.

## 2025-07-10 NOTE — TELEPHONE ENCOUNTER
Called pt and informed of possible side effects of eliquis. Pt willing to start asa 81 and eliquis 2.5 BID. Pt says he was given a 30 day free trial card last OV w/ NPKK.     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

## 2025-07-10 NOTE — TELEPHONE ENCOUNTER
Pt returned call. Pt given message. Pt v/u.  Pt uses    [id number] - [address] - P [phone] - F [phone]  [address] [place] [id number]  Phone: [phone]  Fax: [phone]       Pt would like to know what the side effects of Eliquis are.  Please advise.  Pt can be reached at [phone]

## 2025-07-10 NOTE — TELEPHONE ENCOUNTER
----- Message from JLUIS Keys CNP sent at 7/10/2025  7:28 AM EDT -----  Regarding: RE: Watchman device  This sounds good. EP nurses-can we please start him on aspirin 81 mg daily and Eliquis 2.5 mg twice daily?  If he needs 30-day free card and/or financial resource please let me know.  Thank you!  ----- Message -----  From: REFUGIO Ellis Jr., MD  Sent: 7/9/2025  11:35 PM EDT  To: JLUIS Keys CNP; [facility]  Subject: RE: Watchman device                              What do you think about aspirin and apixaban at 2.5 mg BID.  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  I can try and get [facility] into [facility] and I are working on for shared decision.  Thanks.  ----- Message -----  From: Karli Snowden APRN - CNP  Sent: 7/2/2025   4:15 PM EDT  To: REFUGIO Ellis Jr., MD  Subject: Watchman device                                  Hi,    We saw this young man in clinic for atrial fibrillation and evaluated him for Watchman device and he had shared decision making with Dr. Carrasco.  I saw patient today in office and spoke to him at great length regarding this.  He would like to proceed.  He currently is not taking any blood thinner.  He had bleeding hemorrhoid that was surgically operated on 12/2024.  He states he has had no bleeding since. I informed him that you may want him to start on Eliquis 30 days prior to ensure he can tolerate it.  He is also aware of the pre-MARILUZ or CAT scan that is necessary.     Let me know what I can do to help with the above.  Thank you so much!    Karli

## 2025-07-22 DIAGNOSIS — I48.0 PAROXYSMAL ATRIAL FIBRILLATION (HCC): Primary | ICD-10-CM

## 2025-07-23 ENCOUNTER — PATIENT MESSAGE (OUTPATIENT)
Dept: CARDIOLOGY CLINIC | Age: 45
End: 2025-07-23

## 2025-07-30 NOTE — TELEPHONE ENCOUNTER
I called and spoke with pt to clarify medication instructions. Hold diazepam- which patient is not even taking. Pt v/u

## 2025-07-30 NOTE — TELEPHONE ENCOUNTER
Pt returned call. Message relayed to pt, pt stated he needs clarification on the Diazepam, is supposed to take it or hold it? Pt would also like to know why he needs to take it if he is supposed to. Please call pt back. Thank you!  Best call back: [phone]

## 2025-08-05 ENCOUNTER — ANESTHESIA EVENT (OUTPATIENT)
Dept: CARDIAC CATH/INVASIVE PROCEDURES | Age: 45
End: 2025-08-05
Payer: COMMERCIAL

## 2025-08-06 ENCOUNTER — HOSPITAL ENCOUNTER (OUTPATIENT)
Dept: CARDIAC CATH/INVASIVE PROCEDURES | Age: 45
Discharge: HOME | End: 2025-08-06
Attending: INTERNAL MEDICINE | Admitting: INTERNAL MEDICINE
Payer: COMMERCIAL

## 2025-08-06 ENCOUNTER — ANESTHESIA (OUTPATIENT)
Dept: CARDIAC CATH/INVASIVE PROCEDURES | Age: 45
End: 2025-08-06
Payer: COMMERCIAL

## 2025-08-06 VITALS
RESPIRATION RATE: 21 BRPM | BODY MASS INDEX: 22.7 KG/M2 | DIASTOLIC BLOOD PRESSURE: 95 MMHG | HEART RATE: 59 BPM | WEIGHT: 145 LBS | SYSTOLIC BLOOD PRESSURE: 143 MMHG | OXYGEN SATURATION: 98 %

## 2025-08-06 DIAGNOSIS — I48.0 PAROXYSMAL ATRIAL FIBRILLATION (HCC): ICD-10-CM

## 2025-08-06 LAB
ANION GAP SERPL CALCULATED.3IONS-SCNC: 10 MMOL/L (ref 3–16)
BUN SERPL-MCNC: 13 MG/DL (ref 7–20)
CALCIUM SERPL-MCNC: 10.3 MG/DL (ref 8.3–10.6)
CHLORIDE SERPL-SCNC: 102 MMOL/L (ref 99–110)
CO2 SERPL-SCNC: 29 MMOL/L (ref 21–32)
CREAT SERPL-MCNC: 0.6 MG/DL (ref 0.9–1.3)
DEPRECATED RDW RBC AUTO: 13.2 % (ref 12.4–15.4)
GFR SERPLBLD CREATININE-BSD FMLA CKD-EPI: >90 ML/MIN/{1.73_M2}
GLUCOSE SERPL-MCNC: 99 MG/DL (ref 70–99)
HCT VFR BLD AUTO: 43.6 % (ref 40.5–52.5)
HGB BLD-MCNC: 14.8 G/DL (ref 13.5–17.5)
MCH RBC QN AUTO: 31.6 PG (ref 26–34)
MCHC RBC AUTO-ENTMCNC: 34 G/DL (ref 31–36)
MCV RBC AUTO: 93 FL (ref 80–100)
PLATELET # BLD AUTO: 322 K/UL (ref 135–450)
PMV BLD AUTO: 6.9 FL (ref 5–10.5)
POTASSIUM SERPL-SCNC: 4.9 MMOL/L (ref 3.5–5.1)
RBC # BLD AUTO: 4.69 M/UL (ref 4.2–5.9)
SODIUM SERPL-SCNC: 141 MMOL/L (ref 136–145)
WBC # BLD AUTO: 8.3 K/UL (ref 4–11)

## 2025-08-06 PROCEDURE — 80048 BASIC METABOLIC PNL TOTAL CA: CPT

## 2025-08-06 PROCEDURE — 7100000011 HC PHASE II RECOVERY - ADDTL 15 MIN: Performed by: INTERNAL MEDICINE

## 2025-08-06 PROCEDURE — 6360000002 HC RX W HCPCS: Performed by: ANESTHESIOLOGY

## 2025-08-06 PROCEDURE — 85027 COMPLETE CBC AUTOMATED: CPT

## 2025-08-06 PROCEDURE — 93319 3D ECHO IMG CGEN CAR ANOMAL: CPT | Performed by: INTERNAL MEDICINE

## 2025-08-06 PROCEDURE — 93320 DOPPLER ECHO COMPLETE: CPT | Performed by: INTERNAL MEDICINE

## 2025-08-06 PROCEDURE — 6360000002 HC RX W HCPCS: Performed by: NURSE ANESTHETIST, CERTIFIED REGISTERED

## 2025-08-06 PROCEDURE — 3700000000 HC ANESTHESIA ATTENDED CARE: Performed by: INTERNAL MEDICINE

## 2025-08-06 PROCEDURE — 7100000010 HC PHASE II RECOVERY - FIRST 15 MIN: Performed by: INTERNAL MEDICINE

## 2025-08-06 PROCEDURE — 93312 ECHO TRANSESOPHAGEAL: CPT | Performed by: INTERNAL MEDICINE

## 2025-08-06 PROCEDURE — 93312 ECHO TRANSESOPHAGEAL: CPT

## 2025-08-06 RX ORDER — LIDOCAINE HYDROCHLORIDE 20 MG/ML
INJECTION, SOLUTION INFILTRATION; PERINEURAL
Status: DISCONTINUED | OUTPATIENT
Start: 2025-08-06 | End: 2025-08-06 | Stop reason: SDUPTHER

## 2025-08-06 RX ORDER — LABETALOL HYDROCHLORIDE 5 MG/ML
10 INJECTION, SOLUTION INTRAVENOUS
Status: CANCELLED | OUTPATIENT
Start: 2025-08-06

## 2025-08-06 RX ORDER — SODIUM CHLORIDE 9 MG/ML
INJECTION, SOLUTION INTRAVENOUS PRN
Status: DISCONTINUED | OUTPATIENT
Start: 2025-08-06 | End: 2025-08-06 | Stop reason: HOSPADM

## 2025-08-06 RX ORDER — TRIAMCINOLONE ACETONIDE 55 UG/1
2 SPRAY, METERED NASAL DAILY
COMMUNITY

## 2025-08-06 RX ORDER — PROPOFOL 10 MG/ML
INJECTION, EMULSION INTRAVENOUS
Status: DISCONTINUED | OUTPATIENT
Start: 2025-08-06 | End: 2025-08-06 | Stop reason: SDUPTHER

## 2025-08-06 RX ORDER — SODIUM CHLORIDE 0.9 % (FLUSH) 0.9 %
5-40 SYRINGE (ML) INJECTION EVERY 12 HOURS SCHEDULED
Status: DISCONTINUED | OUTPATIENT
Start: 2025-08-06 | End: 2025-08-06 | Stop reason: HOSPADM

## 2025-08-06 RX ORDER — ONDANSETRON 2 MG/ML
4 INJECTION INTRAMUSCULAR; INTRAVENOUS EVERY 30 MIN PRN
Status: CANCELLED | OUTPATIENT
Start: 2025-08-06

## 2025-08-06 RX ORDER — SODIUM CHLORIDE 9 MG/ML
INJECTION, SOLUTION INTRAVENOUS PRN
Status: CANCELLED | OUTPATIENT
Start: 2025-08-06

## 2025-08-06 RX ORDER — SODIUM CHLORIDE 0.9 % (FLUSH) 0.9 %
5-40 SYRINGE (ML) INJECTION PRN
Status: DISCONTINUED | OUTPATIENT
Start: 2025-08-06 | End: 2025-08-06 | Stop reason: HOSPADM

## 2025-08-06 RX ORDER — SODIUM CHLORIDE, SODIUM LACTATE, POTASSIUM CHLORIDE, CALCIUM CHLORIDE 600; 310; 30; 20 MG/100ML; MG/100ML; MG/100ML; MG/100ML
INJECTION, SOLUTION INTRAVENOUS CONTINUOUS
Status: DISCONTINUED | OUTPATIENT
Start: 2025-08-06 | End: 2025-08-06 | Stop reason: HOSPADM

## 2025-08-06 RX ORDER — FAMOTIDINE 10 MG/ML
20 INJECTION, SOLUTION INTRAVENOUS ONCE
Status: COMPLETED | OUTPATIENT
Start: 2025-08-06 | End: 2025-08-06

## 2025-08-06 RX ORDER — SODIUM CHLORIDE 0.9 % (FLUSH) 0.9 %
5-40 SYRINGE (ML) INJECTION PRN
Status: CANCELLED | OUTPATIENT
Start: 2025-08-06

## 2025-08-06 RX ORDER — MIDAZOLAM HYDROCHLORIDE 1 MG/ML
2 INJECTION, SOLUTION INTRAMUSCULAR; INTRAVENOUS
Status: DISCONTINUED | OUTPATIENT
Start: 2025-08-06 | End: 2025-08-06 | Stop reason: HOSPADM

## 2025-08-06 RX ORDER — SODIUM CHLORIDE 0.9 % (FLUSH) 0.9 %
5-40 SYRINGE (ML) INJECTION EVERY 12 HOURS SCHEDULED
Status: CANCELLED | OUTPATIENT
Start: 2025-08-06

## 2025-08-06 RX ADMIN — PROPOFOL 50 MG: 10 INJECTION, EMULSION INTRAVENOUS at 14:30

## 2025-08-06 RX ADMIN — PROPOFOL 80 MG: 10 INJECTION, EMULSION INTRAVENOUS at 14:28

## 2025-08-06 RX ADMIN — LIDOCAINE HYDROCHLORIDE 100 MG: 20 INJECTION, SOLUTION INFILTRATION; PERINEURAL at 14:28

## 2025-08-06 RX ADMIN — FAMOTIDINE 20 MG: 10 INJECTION, SOLUTION INTRAVENOUS at 11:36

## 2025-08-06 ASSESSMENT — LIFESTYLE VARIABLES: SMOKING_STATUS: 1

## 2025-08-26 ENCOUNTER — TELEPHONE (OUTPATIENT)
Dept: CARDIOLOGY CLINIC | Age: 45
End: 2025-08-26

## 2025-08-26 ENCOUNTER — OFFICE VISIT (OUTPATIENT)
Age: 45
End: 2025-08-26
Payer: COMMERCIAL

## 2025-08-26 VITALS
SYSTOLIC BLOOD PRESSURE: 120 MMHG | BODY MASS INDEX: 23.86 KG/M2 | DIASTOLIC BLOOD PRESSURE: 80 MMHG | OXYGEN SATURATION: 98 % | HEART RATE: 64 BPM | WEIGHT: 152 LBS | HEIGHT: 67 IN

## 2025-08-26 DIAGNOSIS — I48.0 PAF (PAROXYSMAL ATRIAL FIBRILLATION) (HCC): Primary | ICD-10-CM

## 2025-08-26 DIAGNOSIS — I25.10 CORONARY ARTERY DISEASE INVOLVING NATIVE CORONARY ARTERY OF NATIVE HEART, UNSPECIFIED WHETHER ANGINA PRESENT: ICD-10-CM

## 2025-08-26 DIAGNOSIS — Z87.891 FORMER TOBACCO USE: ICD-10-CM

## 2025-08-26 PROCEDURE — 99214 OFFICE O/P EST MOD 30 MIN: CPT | Performed by: INTERNAL MEDICINE

## 2025-08-26 RX ORDER — ROSUVASTATIN CALCIUM 5 MG/1
5 TABLET, COATED ORAL NIGHTLY
Qty: 30 TABLET | Refills: 3 | Status: SHIPPED | OUTPATIENT
Start: 2025-08-26 | End: 2025-08-28

## 2025-08-26 RX ORDER — FLUTICASONE PROPIONATE 50 MCG
1 SPRAY, SUSPENSION (ML) NASAL DAILY
Qty: 16 G | COMMUNITY
Start: 2025-08-26 | End: 2025-08-26

## 2025-08-28 RX ORDER — ROSUVASTATIN CALCIUM 5 MG/1
5 TABLET, COATED ORAL NIGHTLY
Qty: 90 TABLET | Refills: 3 | Status: SHIPPED | OUTPATIENT
Start: 2025-08-28

## 2025-09-04 DIAGNOSIS — I48.91 ATRIAL FIBRILLATION, UNSPECIFIED TYPE (HCC): ICD-10-CM

## 2025-09-04 RX ORDER — ATENOLOL 25 MG/1
25 TABLET ORAL DAILY
Qty: 90 TABLET | Refills: 3 | Status: SHIPPED | OUTPATIENT
Start: 2025-09-04